# Patient Record
Sex: FEMALE | Race: WHITE | NOT HISPANIC OR LATINO | URBAN - METROPOLITAN AREA
[De-identification: names, ages, dates, MRNs, and addresses within clinical notes are randomized per-mention and may not be internally consistent; named-entity substitution may affect disease eponyms.]

---

## 2017-10-24 ENCOUNTER — IMPORTED ENCOUNTER (OUTPATIENT)
Dept: URBAN - METROPOLITAN AREA CLINIC 1 | Facility: CLINIC | Age: 67
End: 2017-10-24

## 2017-10-24 PROBLEM — H04.123: Noted: 2017-10-24

## 2017-10-24 PROBLEM — H25.813: Noted: 2017-10-24

## 2017-10-24 PROBLEM — H16.143: Noted: 2017-10-24

## 2017-10-24 PROBLEM — H43.813: Noted: 2017-10-24

## 2017-10-24 PROCEDURE — 92014 COMPRE OPH EXAM EST PT 1/>: CPT

## 2017-10-24 NOTE — PATIENT DISCUSSION
1.  Cataract OU: Observe for now without intervention. The patient was advised to contact us if any change or worsening of vision2. TAMIKO w/ PEK OU- The continuation of artificial tears were recommended. 3.  PVD OU- RD precautions. 4.  Return for an appointment for a 1dfe/glare in 6 monthswith Dr. Lasha Barker.

## 2018-04-24 ENCOUNTER — IMPORTED ENCOUNTER (OUTPATIENT)
Dept: URBAN - METROPOLITAN AREA CLINIC 1 | Facility: CLINIC | Age: 68
End: 2018-04-24

## 2018-04-24 PROBLEM — H04.123: Noted: 2018-04-24

## 2018-04-24 PROBLEM — H25.813: Noted: 2018-04-24

## 2018-04-24 PROBLEM — H43.813: Noted: 2018-04-24

## 2018-04-24 PROBLEM — H16.143: Noted: 2018-04-24

## 2018-04-24 PROCEDURE — 92015 DETERMINE REFRACTIVE STATE: CPT

## 2018-04-24 PROCEDURE — 92014 COMPRE OPH EXAM EST PT 1/>: CPT

## 2018-04-24 NOTE — PATIENT DISCUSSION
1.  Cataract OU:  Visually Significant secondary to glare discussed the risks benefits alternatives and limitations of cataract surgery. The patient stated a full understanding and a desire to proceed with the procedure. The patient will need to return for preop appointment with cataract measurements and to have any additional questions answered and start pre-operative eye drops as directed. Phaco PCL OS then OD. (Otherwise follow-up October 30 glare) 2. TAMIKO w/ PEK OU- Cont ATs TID OU Routinely. 3.  PVD OU - RD precautions.

## 2018-05-18 ENCOUNTER — IMPORTED ENCOUNTER (OUTPATIENT)
Dept: URBAN - METROPOLITAN AREA CLINIC 1 | Facility: CLINIC | Age: 68
End: 2018-05-18

## 2018-05-18 PROBLEM — H25.812: Noted: 2018-05-18

## 2018-05-18 PROCEDURE — 92136 OPHTHALMIC BIOMETRY: CPT

## 2018-05-18 NOTE — PATIENT DISCUSSION
Cataract OS: Visually Significant secondary to glare discussed the risks benefits alternatives and limitations of cataract surgery. The patient stated a full understanding and a desire to proceed with the procedure. The patient will need to start pre-operative eye drops as directed. Proceed w/ phaco PCL OSDiscussed with patient and patient understands halos around lights and glare are expected post-op particularly at night with the MF lens choice. Pt understood. Return for an appointment for sx OS with Dr. Lasha Barker.

## 2018-05-30 ENCOUNTER — IMPORTED ENCOUNTER (OUTPATIENT)
Dept: URBAN - METROPOLITAN AREA CLINIC 1 | Facility: CLINIC | Age: 68
End: 2018-05-30

## 2018-05-31 ENCOUNTER — IMPORTED ENCOUNTER (OUTPATIENT)
Dept: URBAN - METROPOLITAN AREA CLINIC 1 | Facility: CLINIC | Age: 68
End: 2018-05-31

## 2018-05-31 PROBLEM — Z96.1: Noted: 2018-05-31

## 2018-05-31 PROCEDURE — 99024 POSTOP FOLLOW-UP VISIT: CPT

## 2018-05-31 NOTE — PATIENT DISCUSSION
POD#1 CE/IOL TMF ZKBOO OS doing well. Stable IOP. Continue all 3 gtts as prescribed and until gone. Post op Warnings Reiterated RTC as scheduled

## 2018-06-07 ENCOUNTER — IMPORTED ENCOUNTER (OUTPATIENT)
Dept: URBAN - METROPOLITAN AREA CLINIC 1 | Facility: CLINIC | Age: 68
End: 2018-06-07

## 2018-06-07 PROBLEM — H25.811: Noted: 2018-06-07

## 2018-06-07 PROCEDURE — 92136 OPHTHALMIC BIOMETRY: CPT

## 2018-06-20 ENCOUNTER — IMPORTED ENCOUNTER (OUTPATIENT)
Dept: URBAN - METROPOLITAN AREA CLINIC 1 | Facility: CLINIC | Age: 68
End: 2018-06-20

## 2018-06-21 ENCOUNTER — IMPORTED ENCOUNTER (OUTPATIENT)
Dept: URBAN - METROPOLITAN AREA CLINIC 1 | Facility: CLINIC | Age: 68
End: 2018-06-21

## 2018-06-21 PROBLEM — Z96.1: Noted: 2018-06-21

## 2018-06-21 PROCEDURE — 99024 POSTOP FOLLOW-UP VISIT: CPT

## 2018-06-21 NOTE — PATIENT DISCUSSION
1. POD#1 CE/IOL OD doing well. (Symfony)Continue all 3 gtts as prescribed and until gone. Post op Warnings Reiterated RTC as scheduled 2. Return for an appointment for Return as scheduled with Dr. Salena Azul.

## 2018-07-13 ENCOUNTER — IMPORTED ENCOUNTER (OUTPATIENT)
Dept: URBAN - METROPOLITAN AREA CLINIC 1 | Facility: CLINIC | Age: 68
End: 2018-07-13

## 2018-07-13 PROBLEM — Z96.1: Noted: 2018-07-13

## 2018-07-13 PROCEDURE — 99024 POSTOP FOLLOW-UP VISIT: CPT

## 2018-07-13 NOTE — PATIENT DISCUSSION
1. POW#3 Phaco/ PCL OU (Symfony MF- ZXROO OD TMF- ZKBOO OS; H/o LenSx OU) with PCO OU noted on today's examination. Finish PO meds per schedule Increase ATs to QID OU Routinely. F/u for PCO check/ likely schedule YAG next visit. Return for an appointment in 2.5 mo 30 glare (Consider YAG) with Dr. Lizbeth Sullivan.

## 2019-01-17 ENCOUNTER — IMPORTED ENCOUNTER (OUTPATIENT)
Dept: URBAN - METROPOLITAN AREA CLINIC 1 | Facility: CLINIC | Age: 69
End: 2019-01-17

## 2019-01-17 PROBLEM — Z96.1: Noted: 2019-01-17

## 2019-01-17 PROBLEM — H43.813: Noted: 2019-01-17

## 2019-01-17 PROBLEM — H26.493: Noted: 2019-01-17

## 2019-01-17 PROBLEM — H04.123: Noted: 2019-01-17

## 2019-01-17 PROBLEM — H16.143: Noted: 2019-01-17

## 2019-01-17 PROCEDURE — 92014 COMPRE OPH EXAM EST PT 1/>: CPT

## 2019-01-17 NOTE — PATIENT DISCUSSION
1.  TAMIKO w/ PEK OU- Increase ATs to TID OU Routinely. 2.  PCO OU- Observe. 3.  Pseudophakia OU - (Symfony MF ZXROO OD TMF ZKBOO OS) H/o LenSx OU  4. PVD OU - RD precautions. Letter to PCP Return for an appointment in 6 mo 10 dfe glare (Consider YAG) with Dr. Reji Teran.

## 2019-03-18 ENCOUNTER — IMPORTED ENCOUNTER (OUTPATIENT)
Dept: URBAN - METROPOLITAN AREA CLINIC 1 | Facility: CLINIC | Age: 69
End: 2019-03-18

## 2019-03-18 PROBLEM — H43.813: Noted: 2019-03-18

## 2019-03-18 PROBLEM — H26.493: Noted: 2019-03-18

## 2019-03-18 PROBLEM — H16.143: Noted: 2019-03-18

## 2019-03-18 PROBLEM — H04.123: Noted: 2019-03-18

## 2019-03-18 PROCEDURE — 92015 DETERMINE REFRACTIVE STATE: CPT

## 2019-03-18 PROCEDURE — 92014 COMPRE OPH EXAM EST PT 1/>: CPT

## 2019-03-18 NOTE — PATIENT DISCUSSION
1.  PCO OU -- Visually Significant secondary to glare; Schedule YAG Cap OD then OS. Pros cons risks and benefits. 2.  TAMIKO w/ PEK OU -- Recommend increase the frequent use of OTC AT's TID-QID OU Routinely. 3.  PVD OU -- Old Stable. RD precautions given. 4.  Pseudophakia OU (OD- Symfony MF ZXROO / OS- TMF ZKBOO) H/o LenSx OU. Return for next available YAG Cap Laser (OD then OS) with Dr. Lorna Paniagua.

## 2019-05-03 ENCOUNTER — IMPORTED ENCOUNTER (OUTPATIENT)
Dept: URBAN - METROPOLITAN AREA CLINIC 1 | Facility: CLINIC | Age: 69
End: 2019-05-03

## 2019-05-03 PROBLEM — H26.491: Noted: 2019-05-03

## 2019-05-03 PROCEDURE — 66821 AFTER CATARACT LASER SURGERY: CPT

## 2019-05-03 NOTE — PATIENT DISCUSSION
YAG CAP OD: (Consent signed and scanned into attachments) 1 gtt Prolensa applied. The purpose and nature of the procedure possible alternative methods of treatment the risks involved and the possibility of complications were discussed with patient. The Patient wishes to proceed and the consent was signed. The laser was then performed under topical anesthesia with no complications. Post op instructions were given to patient as well as a follow-up appointment. Patient was advised to call our office if any questions or concerns. Return for an appointment for Return as scheduled YAG Cap OS with Dr. Kassi Strauss.

## 2019-05-17 ENCOUNTER — IMPORTED ENCOUNTER (OUTPATIENT)
Dept: URBAN - METROPOLITAN AREA CLINIC 1 | Facility: CLINIC | Age: 69
End: 2019-05-17

## 2019-05-17 PROBLEM — H26.492: Noted: 2019-05-17

## 2019-05-17 PROCEDURE — 66821 AFTER CATARACT LASER SURGERY: CPT

## 2019-05-17 NOTE — PATIENT DISCUSSION
YAG CAP OS: (Consent signed and scanned into attachments) 1 gtt Prolensa applied. The purpose and nature of the procedure possible alternative methods of treatment the risks involved and the possibility of complications were discussed with patient. The Patient wishes to proceed and the consent was signed. The laser was then performed under topical anesthesia with no complications. Post op instructions were given to patient as well as a follow-up appointment. Patient was advised to call our office if any questions or concerns. Return for an appointment in 1-3 month po/YAG with Dr. Ciro Sharma.

## 2019-08-15 ENCOUNTER — IMPORTED ENCOUNTER (OUTPATIENT)
Dept: URBAN - METROPOLITAN AREA CLINIC 1 | Facility: CLINIC | Age: 69
End: 2019-08-15

## 2019-08-15 PROCEDURE — 99024 POSTOP FOLLOW-UP VISIT: CPT

## 2019-08-15 NOTE — PATIENT DISCUSSION
PO YAG Cap OU: good result F/u in 6 months. Return for an appointment in 6 mo 30 with Dr. Kylee Parrish.

## 2020-02-20 ENCOUNTER — IMPORTED ENCOUNTER (OUTPATIENT)
Dept: URBAN - METROPOLITAN AREA CLINIC 1 | Facility: CLINIC | Age: 70
End: 2020-02-20

## 2020-02-20 PROBLEM — H16.143: Noted: 2020-02-20

## 2020-02-20 PROBLEM — Z96.1: Noted: 2020-02-20

## 2020-02-20 PROBLEM — H43.813: Noted: 2020-02-20

## 2020-02-20 PROBLEM — H04.123: Noted: 2020-02-20

## 2020-02-20 PROCEDURE — 92014 COMPRE OPH EXAM EST PT 1/>: CPT

## 2020-02-20 NOTE — PATIENT DISCUSSION
1.  TAMIKO w/ PEK OU -- Increase ATs to QID OU Routinely. 2.  PVD OU -- Stable. RD precautions. 3.  Pseudophakia OU (OD- Symfony MF ZXROO / OS- TMF ZKBOO) H/o LenSx OU. H/o YAG Cap OU Letter to PCP MRx deferredReturn for an appointment in 1 yr 27 with Dr. Claudeen Cobble.

## 2021-02-18 ENCOUNTER — IMPORTED ENCOUNTER (OUTPATIENT)
Dept: URBAN - METROPOLITAN AREA CLINIC 1 | Facility: CLINIC | Age: 71
End: 2021-02-18

## 2021-02-18 PROBLEM — H16.143: Noted: 2021-02-18

## 2021-02-18 PROBLEM — H43.813: Noted: 2021-02-18

## 2021-02-18 PROBLEM — Z96.1: Noted: 2021-02-18

## 2021-02-18 PROBLEM — H04.123: Noted: 2021-02-18

## 2021-02-18 PROCEDURE — 99214 OFFICE O/P EST MOD 30 MIN: CPT

## 2021-02-18 NOTE — PATIENT DISCUSSION
1.  TAMIKO w/ PEK OU- Begin Restasis BID OU (erx). Recommend ATs QID OU routinely. Advised to instill ATs prior to and immediately after Restasis 2. Pseudophakia OU -  (OD- Symfony MF ZXROO/OS- TMF ZKBOO w/ LenSx OU) H/o YAG Cap OU 3. PVD OU - RD precautions. Patient deferred Manifest Rx today. Return for an appointment in 3 months 10 (check ks) with Dr. Ranulfo Calle.

## 2021-05-18 ENCOUNTER — IMPORTED ENCOUNTER (OUTPATIENT)
Dept: URBAN - METROPOLITAN AREA CLINIC 1 | Facility: CLINIC | Age: 71
End: 2021-05-18

## 2021-05-18 ENCOUNTER — PREPPED CHART (OUTPATIENT)
Dept: URBAN - METROPOLITAN AREA CLINIC 1 | Facility: CLINIC | Age: 71
End: 2021-05-18

## 2021-05-18 PROBLEM — H16.143: Noted: 2021-05-18

## 2021-05-18 PROBLEM — H04.123: Noted: 2021-05-18

## 2021-05-18 PROCEDURE — 99213 OFFICE O/P EST LOW 20 MIN: CPT

## 2021-05-18 NOTE — PATIENT DISCUSSION
1.  TAMIKO w/ PEK OU -- PEK improved OU on Restasis. Cont Restasis BID OU. Continue ATs QID OU routinely. 2.  Pseudophakia OU --  (OD- Symfony MF ZXROO/OS- TMF ZKBOO w/ LenSx OU) H/o YAG Cap OU 3. PVD OU -- RD precautions. .Return for an appointment in January for a 27 with Dr. Dolly Grayson.

## 2021-09-22 NOTE — PATIENT DISCUSSION
Discussed removal of benign keratosis, liquid nitrogen vs shaving off growth. Recommend to shave off growth, pt elects to proceed, consent signed, photos taken 9/22/21.

## 2021-09-22 NOTE — PROCEDURE NOTE: CLINICAL
PROCEDURE NOTE: Tangential Biopsy of Skin, Single Lesion #1 Risks, benefits and alternatives were discussed. Patient desires to proceed with biopsy today. The involved area was prepped using an alcohol wipe and anesthetized using 1% lidocaine with epi. Lesion was excised using a dermablade and discarded. The wound was cauterized to achieve hemostasis. Erythromycin ointment was placed on the wound. The patient tolerated the procedure well and left in good condition. Delgado Murphy

## 2022-02-22 ENCOUNTER — ESTABLISHED PATIENT (OUTPATIENT)
Dept: URBAN - METROPOLITAN AREA CLINIC 1 | Facility: CLINIC | Age: 72
End: 2022-02-22

## 2022-02-22 DIAGNOSIS — Z96.1: ICD-10-CM

## 2022-02-22 DIAGNOSIS — G43.B0: ICD-10-CM

## 2022-02-22 DIAGNOSIS — H43.813: ICD-10-CM

## 2022-02-22 DIAGNOSIS — H04.123: ICD-10-CM

## 2022-02-22 PROCEDURE — 92012 INTRM OPH EXAM EST PATIENT: CPT

## 2022-02-22 ASSESSMENT — VISUAL ACUITY
OS_SC: 20/20 -1
OS_SC: 20/20-1
OD_SC: 20/20-1
OD_SC: 20/20 -2
OU_SC: J1+

## 2022-02-22 ASSESSMENT — TONOMETRY
OD_IOP_MMHG: 12
OD_IOP_MMHG: 12
OS_IOP_MMHG: 12
OS_IOP_MMHG: 12

## 2022-03-25 ENCOUNTER — ESTABLISHED PATIENT (OUTPATIENT)
Dept: URBAN - METROPOLITAN AREA CLINIC 1 | Facility: CLINIC | Age: 72
End: 2022-03-25

## 2022-03-25 DIAGNOSIS — H04.123: ICD-10-CM

## 2022-03-25 PROCEDURE — 99213 OFFICE O/P EST LOW 20 MIN: CPT

## 2022-03-25 ASSESSMENT — TONOMETRY
OD_IOP_MMHG: 14
OS_IOP_MMHG: 14

## 2022-03-25 ASSESSMENT — VISUAL ACUITY
OD_SC: 20/20
OS_SC: 20/20

## 2022-04-08 ASSESSMENT — VISUAL ACUITY
OD_SC: 20/20-1
OD_SC: J1
OD_CC: 20/20-1
OS_SC: 20/25
OS_SC: 20/20
OS_CC: 20/20
OD_SC: 20/20
OS_CC: 20/20
OD_SC: J1+
OD_GLARE: 20/400
OS_SC: J1
OU_SC: 20/25
OS_CC: 20/20
OS_GLARE: 20/400
OS_CC: 20/20-1
OD_CC: 20/20
OD_CC: 20/20-1
OS_CC: 20/30
OS_SC: J1
OD_CC: 20/25-2
OS_SC: J1
OS_CC: J1
OS_CC: 20/20-2
OD_SC: J1
OS_CC: 20/20
OD_SC: J1
OS_SC: J1+
OD_SC: 20/20
OS_SC: J1
OD_CC: 20/30
OD_CC: 20/20-2
OD_CC: 20/20
OD_SC: J1-2
OS_SC: J1
OS_CC: 20/20
OS_CC: 20/20
OD_GLARE: 20/60
OS_GLARE: 20/60
OD_CC: 20/25
OD_GLARE: 20/400
OS_CC: 20/20-1
OS_SC: J1
OD_CC: J1

## 2022-04-08 ASSESSMENT — TONOMETRY
OD_IOP_MMHG: 13
OS_IOP_MMHG: 13
OS_IOP_MMHG: 13
OD_IOP_MMHG: 13
OS_IOP_MMHG: 14
OD_IOP_MMHG: 13
OD_IOP_MMHG: 13
OS_IOP_MMHG: 13
OS_IOP_MMHG: 14
OD_IOP_MMHG: 12
OS_IOP_MMHG: 14
OS_IOP_MMHG: 13
OD_IOP_MMHG: 14
OS_IOP_MMHG: 12
OS_IOP_MMHG: 16
OS_IOP_MMHG: 14
OD_IOP_MMHG: 16
OD_IOP_MMHG: 16
OS_IOP_MMHG: 13
OS_IOP_MMHG: 14

## 2022-08-23 ENCOUNTER — FOLLOW UP (OUTPATIENT)
Dept: URBAN - METROPOLITAN AREA CLINIC 1 | Facility: CLINIC | Age: 72
End: 2022-08-23

## 2022-08-23 DIAGNOSIS — H04.123: ICD-10-CM

## 2022-08-23 PROCEDURE — 99213 OFFICE O/P EST LOW 20 MIN: CPT

## 2022-08-23 ASSESSMENT — TONOMETRY
OS_IOP_MMHG: 13
OD_IOP_MMHG: 13

## 2022-08-23 ASSESSMENT — VISUAL ACUITY
OS_SC: 20/20
OD_SC: 20/20

## 2022-08-23 NOTE — PATIENT DISCUSSION
Cont Restasis BID OU (Erx'd). Unable to tolerate Xiidra. Consider Cequa if cost of Restasis is too much.

## 2023-08-29 ENCOUNTER — FOLLOW UP (OUTPATIENT)
Dept: URBAN - METROPOLITAN AREA CLINIC 1 | Facility: CLINIC | Age: 73
End: 2023-08-29

## 2023-08-29 DIAGNOSIS — H04.123: ICD-10-CM

## 2023-08-29 PROCEDURE — 99213 OFFICE O/P EST LOW 20 MIN: CPT

## 2023-08-29 ASSESSMENT — VISUAL ACUITY
OS_SC: 20/20-2
OD_SC: 20/25

## 2023-08-29 ASSESSMENT — TONOMETRY
OS_IOP_MMHG: 14
OD_IOP_MMHG: 14

## 2023-12-11 ENCOUNTER — HOSPITAL ENCOUNTER (OUTPATIENT)
Facility: HOSPITAL | Age: 73
Setting detail: RECURRING SERIES
Discharge: HOME | End: 2023-12-14
Payer: MEDICARE

## 2023-12-11 PROCEDURE — 97162 PT EVAL MOD COMPLEX 30 MIN: CPT | Performed by: GENERAL ACUTE CARE HOSPITAL

## 2023-12-11 PROCEDURE — 97140 MANUAL THERAPY 1/> REGIONS: CPT | Performed by: GENERAL ACUTE CARE HOSPITAL

## 2023-12-11 NOTE — PROGRESS NOTES
[address] [place] [address] PHYSICAL THERAPY  [address] [facility] [address] [facility], [address] Phone: [phone] Fax [phone]  Plan of Care / Statement of Necessity for Physical Therapy Services     Patient Name: David Weir : 1950   Medical   Diagnosis: Left shoulder pain [M25.512] Treatment Diagnosis: M25.512  LEFT SHOULDER PAIN     Onset Date: 2023 Payor   Payor: Bree Solis / Plan: MEDICARE PART A AND B / Product Type: *No Product type* /    Referral Source: Jenna Lovell MD [facility]): 2023   Prior Hospitalization: See medical history Provider #: [id number]   Prior Level of Function: IND, retired    Comorbidities: Arthritis, Back pain, Prior Surgery     Assessment / key information:    SUBJECTIVE:  The patient presents with c/o left shoulder pain. Patient underwent RTC repair on 2023, underwent 4 weeks of skilled PT following surgery through the week of . On , patient said she was prepping meals and felt excruciating pain and had bruising at the middle of her upper arm. Patient states MD did x-rays, and ultrasound and was informed that the repair was intact and bruising was due to scar tissue. Patient states she is doing pendulums at home. Patient denies numbness/tingling/headaches. Functional Limitations:  Pain with reaching overhead, pain with reaching out to the side, occasional waking from pain,      Max pain: 5/10  Avg pain: 0/10  Min pain: 0/10     FOTO:  61/100     Past Medical Hx:  RTC repair 2023     Co-morbidities: : Arthritis, Back pain, Prior Surgery    Objective Information/Functional Measures/Assessment:        Fall Risk Assessment: Does the patient have a fear of falling? no If yes, what fall risk assessment was performed?  N/A       C/S SCREEN: WNL      strength (RHD):  Right: 55#   Left: 35#      Right shoulder strength: 5/5 globally      Left shoulder strength: NT      Right shoulder AROM:
IND and compliant with HEP and self management of symptoms. Status at eval: DEVELOPED   Current:    The patient will improve passive shoulder ABD to 140 deg to improve her OH mobility. Status at eval: 120 deg   Current:    The patient will improve left shoulder flexion AROM to 140 deg to improve her OH mobility. Status at eval: 130 deg with c/o mild discomfort   Current:        Long term goals to be accomplished in 24 visits: The patient will present with left shoulder strength globally of 5/5 for improved ADL and household chore tolerance. Status at eval: NT   Current:    The patient will improve left shoulder ABD AROM to 140 deg to improve her OH mobility. .   Status at eval: NT per MD instruction on prescription   Current:    The patient will improve left functional ER AROM to C7 and IR AROM to T7 for improved ADL tolerance. Status at eval: functional ER C7, functional IR T12  Current:    The patient will improve FOTO score to 70/100 as a functional indicator of improved mobility. Status at eval: 61/100  Current:       PLAN  yes Continue plan of care  []  Upgrade activities as tolerated  []  Discharge due to :  [x]  Other: Pt to begin treatment  2 times a week for 24 visits. Pt to be re-evaluated after the next 10 visits or 30 days which ever comes first.       Justification for Eval Code Complexity:  Patient History : left RTC repair 09/27/2023  Examination see exam   Clinical Presentation: Evolving with changing characteristics   Clinical Decision Making : FOTO : 64 /100    Joe Shrestha, PT    12/11/2023    1:22 PM    No future appointments.

## 2023-12-22 ENCOUNTER — HOSPITAL ENCOUNTER (OUTPATIENT)
Facility: HOSPITAL | Age: 73
Setting detail: RECURRING SERIES
Discharge: HOME | End: 2023-12-25
Payer: MEDICARE

## 2023-12-22 PROCEDURE — 97530 THERAPEUTIC ACTIVITIES: CPT | Performed by: GENERAL ACUTE CARE HOSPITAL

## 2023-12-22 PROCEDURE — 97110 THERAPEUTIC EXERCISES: CPT | Performed by: GENERAL ACUTE CARE HOSPITAL

## 2023-12-22 PROCEDURE — 97140 MANUAL THERAPY 1/> REGIONS: CPT | Performed by: GENERAL ACUTE CARE HOSPITAL

## 2023-12-22 NOTE — PROGRESS NOTES
PHYSICAL / OCCUPATIONAL THERAPY - DAILY TREATMENT NOTE (updated )    Patient Name: Annia Li    Date: 2023    : 1950  Insurance: Payor: MEDICARE / Plan: MEDICARE PART A AND B / Product Type: *No Product type* /      Patient  verified yes     Visit #   Current / Total 3 24   Time   In / Out 1140 1219   Total Treatment Time 39   Pain   In / Out 0 0   Subjective Functional Status/Changes: Patient states she was able to do a lot of baking this weekend and felt good. NEXT PROGRESS NOTE DUE: 01/10/2024    TREATMENT AREA =  Left shoulder pain [M25.512]    OBJECTIVE    Modalities Rationale:     decrease inflammation and decrease pain to improve patient's ability to progress to PLOF and address remaining functional goals. min [] Estim Unattended, type/location:                                      []  w/ice    []  w/heat    min [] Estim Attended, type/location:                                     []  w/US     []  w/ice    []  w/heat    []  TENS insruct      min []  Mechanical Traction: type/lbs                   []  pro   []  sup   []  int   []  cont    []  before manual    []  after manual    min []  Ultrasound, settings/location:      min []  Iontophoresis w/ dexamethasone, location:                                               []  take home patch       []  in clinic    min  unbilled []  Ice     []  Heat    location/position:     min []  Paraffin,  details:     min []  Vasopneumatic Device, press/temp:     min []  Silverio Francois / Amaya Lasso:     If using vaso (only need to measure limb vaso being performed on)      pre-treatment girth :       post-treatment girth :       measured at (landmark location) :      min []  Other:    Skin assessment post-treatment (if applicable):    []  intact    []  redness- no adverse reaction                 []redness - adverse reaction:      Vasopnuematic compression justification:  Per bilateral girth measures taken and listed above the edema is considered

## 2023-12-27 ENCOUNTER — HOSPITAL ENCOUNTER (OUTPATIENT)
Facility: HOSPITAL | Age: 73
Setting detail: RECURRING SERIES
Discharge: HOME | End: 2023-12-30
Payer: MEDICARE

## 2023-12-27 PROCEDURE — 97110 THERAPEUTIC EXERCISES: CPT | Performed by: GENERAL ACUTE CARE HOSPITAL

## 2023-12-27 PROCEDURE — 97140 MANUAL THERAPY 1/> REGIONS: CPT | Performed by: GENERAL ACUTE CARE HOSPITAL

## 2023-12-27 PROCEDURE — 97530 THERAPEUTIC ACTIVITIES: CPT | Performed by: GENERAL ACUTE CARE HOSPITAL

## 2023-12-27 NOTE — PROGRESS NOTES
PHYSICAL / OCCUPATIONAL THERAPY - DAILY TREATMENT NOTE (updated )    Patient Name: Jacobo Morocho    Date: 2023    : 1950  Insurance: Payor: MEDICARE / Plan: MEDICARE PART A AND B / Product Type: *No Product type* /      Patient  verified yes     Visit #   Current / Total 4 24   Time   In / Out 821  904    Total Treatment Time 43    Pain   In / Out 0 0   Subjective Functional Status/Changes: Patient states her shoulder is feeling really good. NEXT PROGRESS NOTE DUE: 01/10/2024    TREATMENT AREA =  Left shoulder pain [M25.512]    OBJECTIVE    Modalities Rationale:     decrease inflammation and decrease pain to improve patient's ability to progress to PLOF and address remaining functional goals. min [] Estim Unattended, type/location:                                      []  w/ice    []  w/heat    min [] Estim Attended, type/location:                                     []  w/US     []  w/ice    []  w/heat    []  TENS insruct      min []  Mechanical Traction: type/lbs                   []  pro   []  sup   []  int   []  cont    []  before manual    []  after manual    min []  Ultrasound, settings/location:      min []  Iontophoresis w/ dexamethasone, location:                                               []  take home patch       []  in clinic    min  unbilled []  Ice     []  Heat    location/position:     min []  Paraffin,  details:     min []  Vasopneumatic Device, press/temp:     min []  Lannis [place] / Godwin Luo:     If using vaso (only need to measure limb vaso being performed on)      pre-treatment girth :       post-treatment girth :       measured at (landmark location) :      min []  Other:    Skin assessment post-treatment (if applicable):    []  intact    []  redness- no adverse reaction                 []redness - adverse reaction:      Vasopnuematic compression justification:  Per bilateral girth measures taken and listed above the edema is considered significant and having an

## 2023-12-29 ENCOUNTER — HOSPITAL ENCOUNTER (OUTPATIENT)
Facility: HOSPITAL | Age: 73
Setting detail: RECURRING SERIES
Discharge: HOME | End: 2024-01-01
Payer: MEDICARE

## 2023-12-29 PROCEDURE — G0283 ELEC STIM OTHER THAN WOUND: HCPCS

## 2023-12-29 PROCEDURE — 97140 MANUAL THERAPY 1/> REGIONS: CPT

## 2023-12-29 PROCEDURE — 97530 THERAPEUTIC ACTIVITIES: CPT

## 2023-12-29 NOTE — PROGRESS NOTES
Other:    Sheyla Song, JUDITH    12/29/2023    8:00 AM    Future Appointments   Date Time Provider Department Center   12/29/2023 10:20 AM Sheyla Song, PT [facility]   1/3/2024 11:00 AM Sheyla Song, PT [facility]   1/5/2024  9:40 AM Anayeli Summers, PT [facility]   1/8/2024  9:40 AM Sheyla Song, PT [facility]   1/10/2024  9:40 AM Sheyla Song, PT [facility]   1/15/2024  9:40 AM Sheyla Song, PT [facility]   1/17/2024  9:40 AM Sheyla Song, PT [facility]

## 2024-01-03 ENCOUNTER — HOSPITAL ENCOUNTER (OUTPATIENT)
Facility: HOSPITAL | Age: 74
Setting detail: RECURRING SERIES
Discharge: HOME | End: 2024-01-06
Payer: MEDICARE

## 2024-01-03 PROCEDURE — 97110 THERAPEUTIC EXERCISES: CPT | Performed by: GENERAL ACUTE CARE HOSPITAL

## 2024-01-03 PROCEDURE — 97140 MANUAL THERAPY 1/> REGIONS: CPT | Performed by: GENERAL ACUTE CARE HOSPITAL

## 2024-01-03 PROCEDURE — 97530 THERAPEUTIC ACTIVITIES: CPT | Performed by: GENERAL ACUTE CARE HOSPITAL

## 2024-01-03 NOTE — PROGRESS NOTES
PHYSICAL / OCCUPATIONAL THERAPY - DAILY TREATMENT NOTE (updated )    Patient Name: Rachell Monet    Date: 1/3/2024    : 1950  Insurance: Payor: MEDICARE / Plan: MEDICARE PART A AND B / Product Type: *No Product type* /      Patient  verified yes     Visit #   Current / Total 6 24   Time   In / Out 1100 1142    Total Treatment Time 42    Pain   In / Out 2/10 0/10    Subjective Functional Status/Changes: Patient states her shoulder is feeling achy but overall feeling good. She reports good compliance with HEP.      NEXT PROGRESS NOTE DUE: 01/10/2024    TREATMENT AREA =  Left shoulder pain [M25.512]    OBJECTIVE    Modalities Rationale:     decrease pain and increase tissue extensibility to improve patient's ability to progress to PLOF and address remaining functional goals.    PD min [x] Estim Unattended, type/location: IFC to Left shoulder                                      []  w/ice    []  w/heat    min [] Estim Attended, type/location:                                     []  w/US     []  w/ice    []  w/heat    []  TENS insruct      min []  Mechanical Traction: type/lbs                   []  pro   []  sup   []  int   []  cont    []  before manual    []  after manual    min []  Ultrasound, settings/location:      min []  Iontophoresis w/ dexamethasone, location:                                               []  take home patch       []  in clinic    min  unbilled []  Ice     []  Heat    location/position:     min []  Paraffin,  details:     min []  Vasopneumatic Device, press/temp:     min []  [employer]irl[employer] / Fluido:    If using vaso (only need to measure limb vaso being performed on)      pre-treatment girth :       post-treatment girth :       measured at (landmark location) :      min []  Other:    Skin assessment post-treatment (if applicable):    [x]  intact    []  redness- no adverse reaction                 []redness - adverse reaction:      Vasopnuematic compression justification:  Per

## 2024-01-05 ENCOUNTER — HOSPITAL ENCOUNTER (OUTPATIENT)
Facility: HOSPITAL | Age: 74
Setting detail: RECURRING SERIES
Discharge: HOME | End: 2024-01-08
Payer: MEDICARE

## 2024-01-05 PROCEDURE — 97110 THERAPEUTIC EXERCISES: CPT | Performed by: PHYSICAL THERAPIST

## 2024-01-05 PROCEDURE — 97530 THERAPEUTIC ACTIVITIES: CPT | Performed by: PHYSICAL THERAPIST

## 2024-01-05 PROCEDURE — 97140 MANUAL THERAPY 1/> REGIONS: CPT | Performed by: PHYSICAL THERAPIST

## 2024-01-05 NOTE — PROGRESS NOTES
min of TIMED therapeutic procedures (example: do not include dry needle or estim unattended, both untimed codes, in totals to left)  8-22 min = 1 unit; 23-37 min = 2 units; 38-52 min = 3 units; 53-67 min = 4 units; 68-82 min = 5 units   Total Total     [x]  Patient Education billed concurrently with other procedures   [x] Review HEP    [] Progressed/Changed HEP, detail:    [] Other detail:       Objective Information/Functional Measures/Assessment:  Progressed to bent over triceps press with 2# (towel under arm)  Improved mobility with ER AAROM in supine with wand   Pinching pain reported at ~125deg in supine with wand AAROM  No pain upon PROM   Progress as tolerated nv       Patient will continue to benefit from skilled PT / OT services to modify and progress therapeutic interventions, analyze and address functional mobility deficits, analyze and address ROM deficits, analyze and address strength deficits, analyze and address soft tissue restrictions, analyze and cue for proper movement patterns, analyze and modify for postural abnormalities, analyze and address imbalance/dizziness, and instruct in home and community integration to address functional deficits and attain remaining goals.    Progress toward goals / Updated goals:  []  See Progress Note/Recertification    Short term goals to be accomplished in 10 visits:   The pt will be IND and compliant with HEP and self management of symptoms.    Status at eval: DEVELOPED   Current:  reports compliance 12/18/2023, reports good compliance 01/03/2024  The patient will improve passive shoulder ABD to 140 deg to improve her OH mobility.   Status at eval: 120 deg   Current:   AAROM 105 deg 12/27/2023, prom FLEXION ~130deg   The patient will improve left shoulder flexion AROM to 140 deg to improve her OH mobility.   Status at eval: 130 deg with c/o mild discomfort   Current:  remains 130 deg 12/22/2023, AAROM 145 deg 12/27/2023    PLAN  [x]  Continue plan of care  []

## 2024-01-08 ENCOUNTER — HOSPITAL ENCOUNTER (OUTPATIENT)
Facility: HOSPITAL | Age: 74
Setting detail: RECURRING SERIES
Discharge: HOME | End: 2024-01-11
Payer: MEDICARE

## 2024-01-08 PROCEDURE — 97140 MANUAL THERAPY 1/> REGIONS: CPT

## 2024-01-08 PROCEDURE — 97530 THERAPEUTIC ACTIVITIES: CPT

## 2024-01-08 PROCEDURE — G0283 ELEC STIM OTHER THAN WOUND: HCPCS

## 2024-01-08 PROCEDURE — 97110 THERAPEUTIC EXERCISES: CPT

## 2024-01-08 NOTE — PROGRESS NOTES
significant and having an impact on the patient's strength, self care, and ADL's     Therapeutic Procedures:  Tx Min Billable or 1:1 Min (if diff from Tx Min) Procedure, Rationale, Specifics   10 10 [id number] Therapeutic Exercise (timed):  increase ROM, strength, coordination, balance, and proprioception to improve patient's ability to progress to PLOF and address remaining functional goals. (see flow sheet as applicable)     Details if applicable:    Shoulder iso at wall with ball   bicep curls  Tricep extensions       TC:   Seated wrist flexion/ext    24 24 [id number] Therapeutic Activity (timed):  use of dynamic activities replicating functional movements to increase ROM, strength, coordination, balance, and proprioception in order to improve patient's ability to progress to PLOF and address remaining functional goals.  (see flow sheet as applicable)     Details if applicable:    Pulley's flexion, scaption   full cans flexion    TB rows, ext   Supine wand ER, flexion     [id number] Neuromuscular Re-Education (timed):  improve balance, coordination, kinesthetic sense, posture, core stability and proprioception to improve patient's ability to develop conscious control of individual muscles and awareness of position of extremities in order to progress to PLOF and address remaining functional goals. (see flow sheet as applicable)     Details if applicable:     15 15 [id number] Manual Therapy (timed):  decrease pain, increase ROM, increase tissue extensibility, and decrease trigger points to improve patient's ability to progress to PLOF and address remaining functional goals.  The manual therapy interventions were performed at a separate and distinct time from the therapeutic activities interventions . (see flow sheet as applicable)     Details if applicable:    Elbow and shoulder PROM all planes, lateral subscapularis release, sidelying STM along medial Left scapular border          Details if applicable:     49 49 [facility] Totals

## 2024-01-10 ENCOUNTER — HOSPITAL ENCOUNTER (OUTPATIENT)
Facility: HOSPITAL | Age: 74
Setting detail: RECURRING SERIES
Discharge: HOME | End: 2024-01-13
Payer: MEDICARE

## 2024-01-10 PROCEDURE — 97530 THERAPEUTIC ACTIVITIES: CPT

## 2024-01-10 PROCEDURE — 97140 MANUAL THERAPY 1/> REGIONS: CPT

## 2024-01-10 PROCEDURE — G0283 ELEC STIM OTHER THAN WOUND: HCPCS

## 2024-01-10 NOTE — PROGRESS NOTES
PHYSICAL / OCCUPATIONAL THERAPY - DAILY TREATMENT NOTE (updated )    Patient Name: Rachell Monet    Date: 1/10/2024    : 1950  Insurance: Payor: MEDICARE / Plan: MEDICARE PART A AND B / Product Type: *No Product type* /      Patient  verified yes     Visit #   Current / Total 9 24   Time   In / Out 9:41 10:37   Total Treatment Time 56   Pain   In / Out 2/10 1/10   Subjective Functional Status/Changes: It feels better than it did the other day.      NEXT PROGRESS NOTE DUE: 2024    TREATMENT AREA =  Left shoulder pain [M25.512]    OBJECTIVE    Modalities Rationale:     decrease edema, decrease inflammation, and decrease pain to improve patient's ability to progress to PLOF and address remaining functional goals.    10 min [x] Estim Unattended, type/location: IFC to Left shoulder in seated                                      [x]  w/ice    []  w/heat    min [] Estim Attended, type/location:                                     []  w/US     []  w/ice    []  w/heat    []  TENS insruct      min []  Mechanical Traction: type/lbs                   []  pro   []  sup   []  int   []  cont    []  before manual    []  after manual    min []  Ultrasound, settings/location:      min []  Iontophoresis w/ dexamethasone, location:                                               []  take home patch       []  in clinic    min  unbilled []  Ice     []  Heat    location/position:     min []  Paraffin,  details:     min []  Vasopneumatic Device, press/temp:     min []  Whirlpool / Fluido:    If using vaso (only need to measure limb vaso being performed on)      pre-treatment girth :       post-treatment girth :       measured at (landmark location) :      min []  Other:    Skin assessment post-treatment (if applicable):    [x]  intact    []  redness- no adverse reaction                 []redness - adverse reaction:      Vasopnuematic compression justification:  Per bilateral girth measures taken and listed above the 
and self management of symptoms.    Status at eval: DEVELOPED   Current:  progressing, reports ongoing compliance 01/10/2024  The patient will improve passive shoulder ABD to 140 deg to improve her OH mobility.   Status at eval: 120 deg   Current:   not met, 115 deg 01/10/2024  The patient will improve left shoulder flexion AROM to 140 deg to improve her OH mobility.   Status at eval: 130 deg with c/o mild discomfort   Current:  progressing, 136 deg 01/10/2024    LONG-TERM GOALS TO BE ACHIEVED IN 15 treatments   The patient will present with left shoulder strength globally of 5/5 for improved ADL and household chore tolerance.   Status at eval: NT   Current:  flexion 4-/5, extension 5/5, ER 4-/5, IR 4+/5 01/10/2024  The patient will improve left shoulder ABD AROM to 140 deg to improve her OH mobility..   Status at eval: NT per MD instruction on prescription   Current:  NT per MD instruction on prescription, assess after virtual follow up 01/10/2024  The patient will improve left functional ER AROM to C7 and IR AROM to T7 for improved ADL tolerance.   Status at eval: functional ER C7, functional IR T12  Current:  not met, no change 01/10/2024  The patient will improve FOTO score to 70/100 as a functional indicator of improved mobility.    Status at eval: 61/100  Current:  progressing, 70/100 pts 01/10/2024    Payor: MEDICARE / Plan: MEDICARE PART A AND B / Product Type: *No Product type* /     Frequency / Duration:   Patient to be seen   2   times per week for   15    treatments:    RECOMMENDATIONS  We would like to continue therapy for progress to goals stated above.  Continue per initial Plan of Care.    If you have any questions/comments please contact us directly.  Thank you for allowing us to assist in the care of your patient.    Sheyla Song, PT       1/10/2024       9:27 AM

## 2024-01-15 ENCOUNTER — HOSPITAL ENCOUNTER (OUTPATIENT)
Facility: HOSPITAL | Age: 74
Setting detail: RECURRING SERIES
Discharge: HOME | End: 2024-01-18
Payer: MEDICARE

## 2024-01-15 PROCEDURE — G0283 ELEC STIM OTHER THAN WOUND: HCPCS

## 2024-01-15 PROCEDURE — 97140 MANUAL THERAPY 1/> REGIONS: CPT

## 2024-01-15 PROCEDURE — 97530 THERAPEUTIC ACTIVITIES: CPT

## 2024-01-15 PROCEDURE — 97110 THERAPEUTIC EXERCISES: CPT

## 2024-01-15 NOTE — PROGRESS NOTES
PHYSICAL / OCCUPATIONAL THERAPY - DAILY TREATMENT NOTE (updated )    Patient Name: Rachell Monet    Date: 1/15/2024    : 1950  Insurance: Payor: MEDICARE / Plan: MEDICARE PART A AND B / Product Type: *No Product type* /      Patient  verified yes     Visit #   Current / Total 10 24   Time   In / Out 9:40 10:34   Total Treatment Time 54   Pain   In / Out 3/10 2/10   Subjective Functional Status/Changes: It's just sore today.      NEXT PROGRESS NOTE DUE: 2024    TREATMENT AREA =  Left shoulder pain [M25.512]    OBJECTIVE    Modalities Rationale:     decrease inflammation and decrease pain to improve patient's ability to progress to PLOF and address remaining functional goals.    10 min [x] Estim Unattended, type/location: IFC to Left shoulder in seated                                     [x]  w/ice    []  w/heat    min [] Estim Attended, type/location:                                     []  w/US     []  w/ice    []  w/heat    []  TENS insruct      min []  Mechanical Traction: type/lbs                   []  pro   []  sup   []  int   []  cont    []  before manual    []  after manual    min []  Ultrasound, settings/location:      min []  Iontophoresis w/ dexamethasone, location:                                               []  take home patch       []  in clinic    min  unbilled []  Ice     []  Heat    location/position:     min []  Paraffin,  details:     min []  Vasopneumatic Device, press/temp:     min []  Whirlpool / Fluido:    If using vaso (only need to measure limb vaso being performed on)      pre-treatment girth :       post-treatment girth :       measured at (landmark location) :      min []  Other:    Skin assessment post-treatment (if applicable):    [x]  intact    []  redness- no adverse reaction                 []redness - adverse reaction:      Vasopnuematic compression justification:  Per bilateral girth measures taken and listed above the edema is considered significant and

## 2024-01-17 ENCOUNTER — HOSPITAL ENCOUNTER (OUTPATIENT)
Facility: HOSPITAL | Age: 74
Setting detail: RECURRING SERIES
Discharge: HOME | End: 2024-01-20
Payer: MEDICARE

## 2024-01-17 PROCEDURE — 97140 MANUAL THERAPY 1/> REGIONS: CPT

## 2024-01-17 PROCEDURE — 97530 THERAPEUTIC ACTIVITIES: CPT

## 2024-01-17 PROCEDURE — 97110 THERAPEUTIC EXERCISES: CPT

## 2024-01-17 NOTE — PROGRESS NOTES
PHYSICAL / OCCUPATIONAL THERAPY - DAILY TREATMENT NOTE (updated )    Patient Name: Rachell Monet    Date: 2024    : 1950  Insurance: Payor: MEDICARE / Plan: MEDICARE PART A AND B / Product Type: *No Product type* /      Patient  verified yes     Visit #   Current / Total 11 24   Time   In / Out 9:40 10:22    Total Treatment Time 42   Pain   In / Out 1/10 0-1/10   Subjective Functional Status/Changes: It's just achy.      NEXT PROGRESS NOTE DUE: 2024    TREATMENT AREA =  Left shoulder pain [M25.512]    OBJECTIVE    Modalities Rationale:     decrease inflammation and decrease pain to improve patient's ability to progress to PLOF and address remaining functional goals.    PD min [] Estim Unattended, type/location:                                      []  w/ice    []  w/heat    min [] Estim Attended, type/location:                                     []  w/US     []  w/ice    []  w/heat    []  TENS insruct      min []  Mechanical Traction: type/lbs                   []  pro   []  sup   []  int   []  cont    []  before manual    []  after manual    min []  Ultrasound, settings/location:      min []  Iontophoresis w/ dexamethasone, location:                                               []  take home patch       []  in clinic    min  unbilled []  Ice     []  Heat    location/position:     min []  Paraffin,  details:     min []  Vasopneumatic Device, press/temp:     min []  Whirlpool / Fluido:    If using vaso (only need to measure limb vaso being performed on)      pre-treatment girth :       post-treatment girth :       measured at (landmark location) :      min []  Other:    Skin assessment post-treatment (if applicable):    []  intact    []  redness- no adverse reaction                 []redness - adverse reaction:      Vasopnuematic compression justification:  Per bilateral girth measures taken and listed above the edema is considered significant and having an impact on the patient's

## 2024-01-24 ENCOUNTER — HOSPITAL ENCOUNTER (OUTPATIENT)
Facility: HOSPITAL | Age: 74
Setting detail: RECURRING SERIES
Discharge: HOME | End: 2024-01-27
Payer: MEDICARE

## 2024-01-24 PROCEDURE — 97140 MANUAL THERAPY 1/> REGIONS: CPT

## 2024-01-24 PROCEDURE — 97110 THERAPEUTIC EXERCISES: CPT

## 2024-01-24 PROCEDURE — 97530 THERAPEUTIC ACTIVITIES: CPT

## 2024-01-24 NOTE — PROGRESS NOTES
Left scapular border, Left scapular manual distraction            Details if applicable:     39 39 MC BC Totals Reminder: bill using total billable min of TIMED therapeutic procedures (example: do not include dry needle or estim unattended, both untimed codes, in totals to left)  8-22 min = 1 unit; 23-37 min = 2 units; 38-52 min = 3 units; 53-67 min = 4 units; 68-82 min = 5 units   Total Total     [x]  Patient Education billed concurrently with other procedures   [x] Review HEP    [] Progressed/Changed HEP, detail:    [] Other detail:       Objective Information/Functional Measures/Assessment:  -Patient reports that she had increased pain last week without specific aggravating factor, but that pain did resolve after about an hour  -Patient was able to do some yard work yesterday, including raking and cleaning a flower bed for about an hour and a half; she notes no increase in pain at the time. Today Patient does have more tenderness during manual interventions, including pain at end range elevation and tenderness along medial scapular border. She does have good passive scapular mobility today  -Patient reports continued difficulty with sleeping on her Left side, but the past couple of nights she has stayed off of Left side and has not awoken due to pain   -virtual MD follow up tomorrow; plan to progress strengthening if able at next PT session     Patient will continue to benefit from skilled PT / OT services to modify and progress therapeutic interventions, analyze and address functional mobility deficits, analyze and address ROM deficits, analyze and address strength deficits, analyze and address soft tissue restrictions, analyze and cue for proper movement patterns, analyze and modify for postural abnormalities, and instruct in home and community integration to address functional deficits and attain remaining goals.    Progress toward goals / Updated goals:  []  See Progress Note/Recertification    LONG-TERM

## 2024-01-26 ENCOUNTER — HOSPITAL ENCOUNTER (OUTPATIENT)
Facility: HOSPITAL | Age: 74
Setting detail: RECURRING SERIES
Discharge: HOME | End: 2024-01-29
Payer: MEDICARE

## 2024-01-26 PROCEDURE — 97140 MANUAL THERAPY 1/> REGIONS: CPT | Performed by: GENERAL ACUTE CARE HOSPITAL

## 2024-01-26 PROCEDURE — G0283 ELEC STIM OTHER THAN WOUND: HCPCS | Performed by: GENERAL ACUTE CARE HOSPITAL

## 2024-01-26 PROCEDURE — 97530 THERAPEUTIC ACTIVITIES: CPT | Performed by: GENERAL ACUTE CARE HOSPITAL

## 2024-01-26 NOTE — PROGRESS NOTES
listed above the edema is considered significant and having an impact on the patient's strength, self care, and ADL's     Therapeutic Procedures:  Tx Min Billable or 1:1 Min (if diff from Tx Min) Procedure, Rationale, Specifics   TC  TC  85769 Therapeutic Exercise (timed):  increase ROM, strength, coordination, balance, and proprioception to improve patient's ability to progress to PLOF and address remaining functional goals. (see flow sheet as applicable)     Details if applicable:    Shoulder iso at wall with ball - held  bicep curls  Tricep extensions    28  28  68819 Therapeutic Activity (timed):  use of dynamic activities replicating functional movements to increase ROM, strength, coordination, balance, and proprioception in order to improve patient's ability to progress to PLOF and address remaining functional goals.  (see flow sheet as applicable)     Details if applicable:    UBE  TB rows, ext   Supine wand ER, flexion  full cans flexion, scaption, ABD  Standing serratus punches-TC  Wall push ups      54594 Neuromuscular Re-Education (timed):  improve balance, coordination, kinesthetic sense, posture, core stability and proprioception to improve patient's ability to develop conscious control of individual muscles and awareness of position of extremities in order to progress to PLOF and address remaining functional goals. (see flow sheet as applicable)     Details if applicable:     15 15 22639 Manual Therapy (timed):  decrease pain, increase ROM, increase tissue extensibility, and decrease trigger points to improve patient's ability to progress to PLOF and address remaining functional goals.  The manual therapy interventions were performed at a separate and distinct time from the therapeutic activities interventions . (see flow sheet as applicable)     Details if applicable:   Elbow and shoulder PROM all planes, D2 flex/ext, TrP release to middle deltoid           Details if applicable:     43  43  MC BC

## 2024-01-31 ENCOUNTER — HOSPITAL ENCOUNTER (OUTPATIENT)
Facility: HOSPITAL | Age: 74
Setting detail: RECURRING SERIES
Discharge: HOME | End: 2024-02-03
Payer: MEDICARE

## 2024-01-31 PROCEDURE — 97110 THERAPEUTIC EXERCISES: CPT

## 2024-01-31 PROCEDURE — 97530 THERAPEUTIC ACTIVITIES: CPT

## 2024-01-31 PROCEDURE — 97140 MANUAL THERAPY 1/> REGIONS: CPT

## 2024-01-31 NOTE — PROGRESS NOTES
PHYSICAL / OCCUPATIONAL THERAPY - DAILY TREATMENT NOTE (updated )    Patient Name: Rachell Monet    Date: 2024    : 1950  Insurance: Payor: MEDICARE / Plan: MEDICARE PART A AND B / Product Type: *No Product type* /      Patient  verified yes     Visit #   Current / Total 14 24   Time   In / Out 9:40 10:23   Total Treatment Time 43   Pain   In / Out 1/10 1/10   Subjective Functional Status/Changes: I feel good. I think I'm sleeping better.      NEXT PROGRESS NOTE DUE: 2024    TREATMENT AREA =  Left shoulder pain [M25.512]    OBJECTIVE    Modalities Rationale:     decrease edema, decrease inflammation, and decrease pain to improve patient's ability to progress to PLOF and address remaining functional goals.    PD min [] Estim Unattended, type/location:                                      []  w/ice    []  w/heat    min [] Estim Attended, type/location:                                     []  w/US     []  w/ice    []  w/heat    []  TENS insruct      min []  Mechanical Traction: type/lbs                   []  pro   []  sup   []  int   []  cont    []  before manual    []  after manual    min []  Ultrasound, settings/location:      min []  Iontophoresis w/ dexamethasone, location:                                               []  take home patch       []  in clinic    min  unbilled []  Ice     []  Heat    location/position:     min []  Paraffin,  details:     min []  Vasopneumatic Device, press/temp:     min []  Whirlpool / Fluido:    If using vaso (only need to measure limb vaso being performed on)      pre-treatment girth :       post-treatment girth :       measured at (landmark location) :      min []  Other:    Skin assessment post-treatment (if applicable):    []  intact    []  redness- no adverse reaction                 []redness - adverse reaction:      Vasopnuematic compression justification:  Per bilateral girth measures taken and listed above the edema is considered significant and

## 2024-02-02 ENCOUNTER — HOSPITAL ENCOUNTER (OUTPATIENT)
Facility: HOSPITAL | Age: 74
Setting detail: RECURRING SERIES
Discharge: HOME | End: 2024-02-05
Payer: MEDICARE

## 2024-02-02 PROCEDURE — G0283 ELEC STIM OTHER THAN WOUND: HCPCS | Performed by: GENERAL ACUTE CARE HOSPITAL

## 2024-02-02 PROCEDURE — 97530 THERAPEUTIC ACTIVITIES: CPT | Performed by: GENERAL ACUTE CARE HOSPITAL

## 2024-02-02 PROCEDURE — 97140 MANUAL THERAPY 1/> REGIONS: CPT | Performed by: GENERAL ACUTE CARE HOSPITAL

## 2024-02-02 PROCEDURE — 97110 THERAPEUTIC EXERCISES: CPT | Performed by: GENERAL ACUTE CARE HOSPITAL

## 2024-02-02 NOTE — PROGRESS NOTES
15PHYSICAL / OCCUPATIONAL THERAPY - DAILY TREATMENT NOTE (updated )    Patient Name: Rachell Monet    Date: 2024    : 1950  Insurance: Payor: MEDICARE / Plan: MEDICARE PART A AND B / Product Type: *No Product type* /      Patient  verified yes     Visit #   Current / Total 14 24   Time   In / Out 9:41 1035    Total Treatment Time 54    Pain   In / Out 1/10 1/10    Subjective Functional Status/Changes: Patient states she is having 4/10 right sided neck pain and difficulty turning her head.      NEXT PROGRESS NOTE DUE: 2024    TREATMENT AREA =  Left shoulder pain [M25.512]    OBJECTIVE    Modalities Rationale:     decrease edema, decrease inflammation, and decrease pain to improve patient's ability to progress to PLOF and address remaining functional goals.    10 min [x] Estim Unattended, type/location: RIGHT UT premod in seated                                      []  w/ice    [x]  w/heat    min [] Estim Attended, type/location:                                     []  w/US     []  w/ice    []  w/heat    []  TENS insruct      min []  Mechanical Traction: type/lbs                   []  pro   []  sup   []  int   []  cont    []  before manual    []  after manual    min []  Ultrasound, settings/location:      min []  Iontophoresis w/ dexamethasone, location:                                               []  take home patch       []  in clinic    min  unbilled []  Ice     []  Heat    location/position:     min []  Paraffin,  details:     min []  Vasopneumatic Device, press/temp:     min []  Whirlpool / Fluido:    If using vaso (only need to measure limb vaso being performed on)      pre-treatment girth :       post-treatment girth :       measured at (landmark location) :      min []  Other:    Skin assessment post-treatment (if applicable):    []  intact    []  redness- no adverse reaction                 []redness - adverse reaction:      Vasopnuematic compression justification:  Per bilateral

## 2024-02-07 ENCOUNTER — HOSPITAL ENCOUNTER (OUTPATIENT)
Facility: HOSPITAL | Age: 74
Setting detail: RECURRING SERIES
Discharge: HOME | End: 2024-02-10
Payer: MEDICARE

## 2024-02-07 PROCEDURE — 97530 THERAPEUTIC ACTIVITIES: CPT | Performed by: GENERAL ACUTE CARE HOSPITAL

## 2024-02-07 PROCEDURE — 97112 NEUROMUSCULAR REEDUCATION: CPT | Performed by: GENERAL ACUTE CARE HOSPITAL

## 2024-02-07 PROCEDURE — 97140 MANUAL THERAPY 1/> REGIONS: CPT | Performed by: GENERAL ACUTE CARE HOSPITAL

## 2024-02-07 PROCEDURE — 97110 THERAPEUTIC EXERCISES: CPT | Performed by: GENERAL ACUTE CARE HOSPITAL

## 2024-02-07 NOTE — PROGRESS NOTES
PHYSICAL / OCCUPATIONAL THERAPY - DAILY TREATMENT NOTE (updated )    Patient Name: Rachell Monet    Date: 2024    : 1950  Insurance: Payor: MEDICARE / Plan: MEDICARE PART A AND B / Product Type: *No Product type* /      Patient  verified yes     Visit #   Current / Total 16 24   Time   In / Out 940 1030    Total Treatment Time 50    Pain   In / Out 0/10 0/10    Subjective Functional Status/Changes: Patient reports her neck feels better.      NEXT PROGRESS NOTE DUE: 2024    TREATMENT AREA =  Left shoulder pain [M25.512]    OBJECTIVE    Modalities Rationale:     decrease edema, decrease inflammation, and decrease pain to improve patient's ability to progress to PLOF and address remaining functional goals.    PD min [x] Estim Unattended, type/location: RIGHT UT premod in seated                                      []  w/ice    [x]  w/heat    min [] Estim Attended, type/location:                                     []  w/US     []  w/ice    []  w/heat    []  TENS insruct      min []  Mechanical Traction: type/lbs                   []  pro   []  sup   []  int   []  cont    []  before manual    []  after manual    min []  Ultrasound, settings/location:      min []  Iontophoresis w/ dexamethasone, location:                                               []  take home patch       []  in clinic    min  unbilled []  Ice     []  Heat    location/position:     min []  Paraffin,  details:     min []  Vasopneumatic Device, press/temp:     min []  Whirlpool / Fluido:    If using vaso (only need to measure limb vaso being performed on)      pre-treatment girth :       post-treatment girth :       measured at (landmark location) :      min []  Other:    Skin assessment post-treatment (if applicable):    []  intact    []  redness- no adverse reaction                 []redness - adverse reaction:      Vasopnuematic compression justification:  Per bilateral girth measures taken and listed above the edema is

## 2024-02-09 ENCOUNTER — HOSPITAL ENCOUNTER (OUTPATIENT)
Facility: HOSPITAL | Age: 74
Setting detail: RECURRING SERIES
Discharge: HOME | End: 2024-02-12
Payer: MEDICARE

## 2024-02-09 PROCEDURE — 97530 THERAPEUTIC ACTIVITIES: CPT | Performed by: GENERAL ACUTE CARE HOSPITAL

## 2024-02-09 PROCEDURE — 97110 THERAPEUTIC EXERCISES: CPT | Performed by: GENERAL ACUTE CARE HOSPITAL

## 2024-02-09 NOTE — PROGRESS NOTES
[facility]  [address] [place] [address]  Phone: [phone]   [phone]  PHYSICAL THERAPY PROGRESS NOTE  Patient Name: Rachell Monet : 1950   Treatment/Medical Diagnosis: Left shoulder pain [M25.512]   Referral Source: Augusto Candelaria MD     Date of Initial Visit: 2023 Attended Visits: 17 Missed Visits: 0     SUMMARY OF TREATMENT  The pt has been seen for 17 visits to address left shoulder pain s/p RTC repair on 2023. Therapeutic interventions have included therapeutic exercise, therapeutic activity, neuromuscular re-education, manual treatment, modalities and patient education.     CURRENT STATUS  Subjective:   % improvement: 70-80%  Max pain 0/10  Avg pain 0/10  Min pain 0/10     Current improvements: strength, improved sleep, able to sleep on left side, nagging and aching pain gone  Remaining functional limitations: feels resistance when reaching up overhead or behind her back, difficulty with reaching for seat belt, some difficulty with lifting overhead      FOTO: 77/100 +7 points     Objective Information/Functional Measures/Assessment:  The patient presents for reassessment following 17 visits to address left shoulder pain s/p RTC repair on 2023   Improvement in functional mobility indicated by FOTO score change of +7 points to 77/100   She has maintained good compliance with HEP  Left shoulder strength improved to flexion 5/5, abduction 4+/5, ER 4+/5, IR 5/5   Left shoulder AROM improved to flexion 140 deg and abduction 120 deg   Left functional ER and IR now equal to the right, however noted increased challenge with movement. Patient states she plans to buy set of over-the-door pulleys for home to continue gains in flexion, ABD and functional IR ROM.   Patient able to perform OH activity for 2 minutes however notes increased fatigue of left shoulder 6/10 intensity.  The patient will benefit from 
analyze and address functional mobility deficits, analyze and address ROM deficits, analyze and address strength deficits, analyze and address soft tissue restrictions, analyze and cue for proper movement patterns, analyze and modify for postural abnormalities, analyze and address imbalance/dizziness, and instruct in home and community integration to address functional deficits and attain remaining goals.    Progress toward goals / Updated goals:  []  See Progress Note/Recertification    LONG-TERM GOALS TO BE ACHIEVED IN 15 treatments   The patient will present with left shoulder strength globally of 5/5 for improved ADL and household chore tolerance.   Status at eval: NT   Current:  flexion 4-/5, extension 5/5, ER 4-/5, IR 4+/5 01/10/2024, flexion 5/5, abduction 4+/5, ER 4+/5, IR 5/5  02/09/2024  The patient will improve left shoulder ABD AROM to 140 deg to improve her OH mobility..   Status at eval: NT per MD instruction on prescription   Current:  NT per MD instruction on prescription, assess after virtual follow up 01/10/2024; initiated shoulder ABD strengthening per MD instruction 01/26/2024, 135 deg 02/02/2024; flexion 140 deg, abduction 120 deg 02/0/9/2024  The patient will improve left functional ER AROM to C7 and IR AROM to T7 for improved ADL tolerance.   Status at eval: functional ER C7, functional IR T12  Current:  progressing, IR to roughly T10, but just briefly and with some momentum; ER behind head to base of neck, able to reach around to outside of Right ear 01/17/2024, Left functional ER and IR now equal to the right, however noted increased challenge with movement 02/09/2024  The patient will improve FOTO score to 70/100 as a functional indicator of improved mobility.    Status at eval: 61/100  Current:  progressing, 70/100 pts 01/10/2024, MET 77/100 02/09/2024    PLAN  [x]  Continue plan of care  []  Upgrade activities as tolerated  []  Discharge due to :  [x]  Other: Pt to continue current

## 2024-02-13 ENCOUNTER — APPOINTMENT (OUTPATIENT)
Facility: HOSPITAL | Age: 74
End: 2024-02-13
Payer: MEDICARE

## 2024-02-13 ENCOUNTER — COMPREHENSIVE EXAM (OUTPATIENT)
Dept: URBAN - METROPOLITAN AREA CLINIC 1 | Facility: CLINIC | Age: 74
End: 2024-02-13

## 2024-02-13 DIAGNOSIS — H04.123: ICD-10-CM

## 2024-02-13 DIAGNOSIS — H43.813: ICD-10-CM

## 2024-02-13 DIAGNOSIS — Z96.1: ICD-10-CM

## 2024-02-13 PROCEDURE — 99214 OFFICE O/P EST MOD 30 MIN: CPT

## 2024-02-13 ASSESSMENT — TONOMETRY
OD_IOP_MMHG: 13
OS_IOP_MMHG: 13

## 2024-02-13 ASSESSMENT — VISUAL ACUITY
OD_SC: 20/20-1
OS_SC: J1+
OS_SC: 20/20
OD_SC: J1+

## 2024-02-15 ENCOUNTER — HOSPITAL ENCOUNTER (OUTPATIENT)
Facility: HOSPITAL | Age: 74
Setting detail: RECURRING SERIES
Discharge: HOME | End: 2024-02-18
Payer: MEDICARE

## 2024-02-15 PROCEDURE — 97530 THERAPEUTIC ACTIVITIES: CPT

## 2024-02-15 PROCEDURE — 97110 THERAPEUTIC EXERCISES: CPT

## 2024-02-15 PROCEDURE — 97140 MANUAL THERAPY 1/> REGIONS: CPT

## 2024-02-15 NOTE — PROGRESS NOTES
PHYSICAL / OCCUPATIONAL THERAPY - DAILY TREATMENT NOTE (updated )    Patient Name: Rachell Monet    Date: 2/15/2024    : 1950  Insurance: Payor: MEDICARE / Plan: MEDICARE PART A AND B / Product Type: *No Product type* /      Patient  verified yes     Visit #   Current / Total 18 24   Time   In / Out 11:02 11:48   Total Treatment Time 46   Pain   In / Out 0/10 010   Subjective Functional Status/Changes:  I still have a hard time reaching up with my left hand to fix my hair, I can feel it pulling to the back and front of my shoulder when I try          NEXT PROGRESS NOTE DUE: 03/10/2024 **RECERTIFICATION**    TREATMENT AREA =  Left shoulder pain [M25.512]    OBJECTIVE    Modalities Rationale:     decrease edema, decrease inflammation, and decrease pain to improve patient's ability to progress to PLOF and address remaining functional goals.    PD min [x] Estim Unattended, type/location: RIGHT UT premod in seated                                      []  w/ice    [x]  w/heat    min [] Estim Attended, type/location:                                     []  w/US     []  w/ice    []  w/heat    []  TENS insruct      min []  Mechanical Traction: type/lbs                   []  pro   []  sup   []  int   []  cont    []  before manual    []  after manual    min []  Ultrasound, settings/location:      min []  Iontophoresis w/ dexamethasone, location:                                               []  take home patch       []  in clinic    min  unbilled []  Ice     []  Heat    location/position:     min []  Paraffin,  details:     min []  Vasopneumatic Device, press/temp:     min []  Whirlpool / Fluido:    If using vaso (only need to measure limb vaso being performed on)      pre-treatment girth :       post-treatment girth :       measured at (landmark location) :      min []  Other:    Skin assessment post-treatment (if applicable):    []  intact    []  redness- no adverse reaction                 []redness -

## 2024-02-21 ENCOUNTER — HOSPITAL ENCOUNTER (OUTPATIENT)
Facility: HOSPITAL | Age: 74
Setting detail: RECURRING SERIES
Discharge: HOME | End: 2024-02-24
Payer: MEDICARE

## 2024-02-21 PROCEDURE — 97530 THERAPEUTIC ACTIVITIES: CPT

## 2024-02-21 PROCEDURE — 97140 MANUAL THERAPY 1/> REGIONS: CPT

## 2024-02-21 NOTE — PROGRESS NOTES
PHYSICAL / OCCUPATIONAL THERAPY - DAILY TREATMENT NOTE (updated )    Patient Name: Rachell Monet    Date: 2024    : 1950  Insurance: Payor: MEDICARE / Plan: MEDICARE PART A AND B / Product Type: *No Product type* /      Patient  verified yes     Visit #   Current / Total 19 24   Time   In / Out 10:22 11:07    Total Treatment Time 45   Pain   In / Out 1/10 0/10   Subjective Functional Status/Changes: This weekend I was away and I was carrying a lot of stuff and I'm just a little sore.      NEXT PROGRESS NOTE DUE: 03/10/2024    TREATMENT AREA =  Left shoulder pain [M25.512]    OBJECTIVE    Modalities Rationale:     decrease pain and increase tissue extensibility to improve patient's ability to progress to PLOF and address remaining functional goals.     min [] Estim Unattended, type/location:                                      []  w/ice    []  w/heat    min [] Estim Attended, type/location:                                     []  w/US     []  w/ice    []  w/heat    []  TENS insruct      min []  Mechanical Traction: type/lbs                   []  pro   []  sup   []  int   []  cont    []  before manual    []  after manual    min []  Ultrasound, settings/location:      min []  Iontophoresis w/ dexamethasone, location:                                               []  take home patch       []  in clinic   PD min  unbilled []  Ice     []  Heat    location/position:     min []  Paraffin,  details:     min []  Vasopneumatic Device, press/temp:     min []  Whirlpool / Fluido:    If using vaso (only need to measure limb vaso being performed on)      pre-treatment girth :       post-treatment girth :       measured at (landmark location) :      min []  Other:    Skin assessment post-treatment (if applicable):    []  intact    []  redness- no adverse reaction                 []redness - adverse reaction:      Vasopnuematic compression justification:  Per bilateral girth measures taken and listed above the

## 2024-02-28 ENCOUNTER — HOSPITAL ENCOUNTER (OUTPATIENT)
Facility: HOSPITAL | Age: 74
Setting detail: RECURRING SERIES
Discharge: HOME | End: 2024-03-02
Payer: MEDICARE

## 2024-02-28 PROCEDURE — 97140 MANUAL THERAPY 1/> REGIONS: CPT

## 2024-02-28 NOTE — PROGRESS NOTES
PHYSICAL / OCCUPATIONAL THERAPY - DAILY TREATMENT NOTE (updated )    Patient Name: Rachell Monet    Date: 2024    : 1950  Insurance: Payor: MEDICARE / Plan: MEDICARE PART A AND B / Product Type: *No Product type* /      Patient  verified yes     Visit #   Current / Total 20 24   Time   In / Out 10:23 11:02   Total Treatment Time 39   Pain   In / Out 1-2/10 0.5/10   Subjective Functional Status/Changes: It's sore in my shoulder today, I scrubbed the back of the house yesterday. It felt good after last time.      NEXT PROGRESS NOTE DUE: 03/10/2024    TREATMENT AREA =  Left shoulder pain [M25.512]    OBJECTIVE    Modalities Rationale:     decrease pain and increase tissue extensibility to improve patient's ability to progress to PLOF and address remaining functional goals.     min [] Estim Unattended, type/location:                                      []  w/ice    []  w/heat    min [] Estim Attended, type/location:                                     []  w/US     []  w/ice    []  w/heat    []  TENS insruct      min []  Mechanical Traction: type/lbs                   []  pro   []  sup   []  int   []  cont    []  before manual    []  after manual    min []  Ultrasound, settings/location:      min []  Iontophoresis w/ dexamethasone, location:                                               []  take home patch       []  in clinic   PD min  unbilled []  Ice     []  Heat    location/position:     min []  Paraffin,  details:     min []  Vasopneumatic Device, press/temp:     min []  Whirlpool / Fluido:    If using vaso (only need to measure limb vaso being performed on)      pre-treatment girth :       post-treatment girth :       measured at (landmark location) :      min []  Other:    Skin assessment post-treatment (if applicable):    []  intact    []  redness- no adverse reaction                 []redness - adverse reaction:      Vasopnuematic compression justification:  Per bilateral girth measures

## 2024-03-06 ENCOUNTER — HOSPITAL ENCOUNTER (OUTPATIENT)
Facility: HOSPITAL | Age: 74
Setting detail: RECURRING SERIES
Discharge: HOME | End: 2024-03-09
Payer: MEDICARE

## 2024-03-06 PROCEDURE — 97110 THERAPEUTIC EXERCISES: CPT | Performed by: GENERAL ACUTE CARE HOSPITAL

## 2024-03-06 PROCEDURE — 97530 THERAPEUTIC ACTIVITIES: CPT | Performed by: GENERAL ACUTE CARE HOSPITAL

## 2024-03-06 NOTE — PROGRESS NOTES
[facility]  [address], [place] [address], Phone [phone], fax [phone]  CONTINUED PLAN OF CARE/RECERTIFICATION FOR PHYSICAL THERAPY          Patient Name: Rachell Monet : 1950   Treatment/Medical Diagnosis: Left shoulder pain [M25.512]   Onset Date: 2023    Referral Source: Augusto Candelaria MD Start of Care (SOC): 2023   Prior Hospitalization: See Medical History Provider #: [id number]   Prior Level of Function: IND, retired   Comorbidities: Arthritis, back pain, prior surgery   Visits from SOC: 21 Missed Visits: 0     Progress to Goals:  LONG-TERM GOALS TO BE ACHIEVED IN 7 treatments :  The patient will be IND and compliant with HEP to prepare for DC.   Status at eval: DEVELOPED   Status at PN (01/10/2024):  reports ongoing compliance  Status at PN (2024): reports ongoing compliance  Current status: progressing, reports compliance, purchased home pulley set 2024, compliance with pulley's but admits to poor compliance with strengthening 2024  The patient will improve left shoulder ABD and ER strength to 5/5 for improved ADL tolerance.   Status at eval: NT  Status at PN (01/10/2024):  NT  Status at PN (2024): flexion 5/5, abduction 4+/5, ER 4+/5, IR 5/5  Current status: progressing, IR 5/5, ER remains 4+/5 2024, 5/5 globally 2024  The patient will perform OH activity for 2 minutes with c/o muscle fatigue <3/10 intensity for improved OH mobility.   Status at eval: NT  Status at PN (01/10/2024):  NT  Status at PN (2024): able to perform 2 minutes OH with fatigue 6/10 intensity  Current: 2 minutes with fatigue 6/10 intensity 2024     Key Functional Changes/Progress:   Subjective:  % improvement: 95%  Max pain 1-2/10 ache, 0/10 pain   Avg pain 0/10  Min pain 0/10     Current improvements: improved ability with reaching behind her back, improved ability to reach seated belt, 
post-treatment girth :       measured at (landmark location) :      min []  Other:    Skin assessment post-treatment (if applicable):    []  intact    []  redness- no adverse reaction                 []redness - adverse reaction:      Vasopnuematic compression justification:  Per bilateral girth measures taken and listed above the edema is considered significant and having an impact on the patient's strength, self care, and ADL's     Therapeutic Procedures:  Tx Min Billable or 1:1 Min (if diff from Tx Min) Procedure, Rationale, Specifics   12 12 [id number] Therapeutic Exercise (timed):  increase ROM, strength, coordination, balance, and proprioception to improve patient's ability to progress to PLOF and address remaining functional goals. (see flow sheet as applicable)     Details if applicable:    REASSESSMENT, HEP REVIEW     TC:   Bent row kick back, M  bicep curls     12 11 [id number] Therapeutic Activity (timed):  use of dynamic activities replicating functional movements to increase ROM, strength, coordination, balance, and proprioception in order to improve patient's ability to progress to PLOF and address remaining functional goals.  (see flow sheet as applicable)     Details if applicable:    UBE  FOTO ASSESSMENT AND REVIEW       TC:   full cans flexion, scaption, ABD  STD scap stabs   Wall push ups- TC  TB rows, ext      [id number] Neuromuscular Re-Education (timed):  improve balance, coordination, kinesthetic sense, posture, core stability and proprioception to improve patient's ability to develop conscious control of individual muscles and awareness of position of extremities in order to progress to PLOF and address remaining functional goals. (see flow sheet as applicable)     Details if applicable:     held held [id number] Manual Therapy (timed):  decrease pain, increase ROM, increase tissue extensibility, and decrease trigger points to improve patient's ability to progress to PLOF and address remaining functional goals.

## 2024-08-15 ENCOUNTER — FOLLOW UP (OUTPATIENT)
Dept: URBAN - METROPOLITAN AREA CLINIC 1 | Facility: CLINIC | Age: 74
End: 2024-08-15

## 2024-08-15 DIAGNOSIS — H04.123: ICD-10-CM

## 2024-08-15 DIAGNOSIS — H16.143: ICD-10-CM

## 2024-08-15 PROCEDURE — 99213 OFFICE O/P EST LOW 20 MIN: CPT

## 2024-08-15 ASSESSMENT — VISUAL ACUITY
OS_SC: J1
OS_SC: 20/25
OD_SC: 20/25-1
OD_SC: J1

## 2024-08-15 ASSESSMENT — TONOMETRY
OS_IOP_MMHG: 14
OD_IOP_MMHG: 13

## 2024-10-31 ENCOUNTER — EMERGENCY VISIT (OUTPATIENT)
Dept: URBAN - METROPOLITAN AREA CLINIC 1 | Facility: CLINIC | Age: 74
End: 2024-10-31

## 2024-10-31 DIAGNOSIS — H11.31: ICD-10-CM

## 2024-10-31 PROCEDURE — 99213 OFFICE O/P EST LOW 20 MIN: CPT

## 2025-02-28 ENCOUNTER — COMPREHENSIVE EXAM (OUTPATIENT)
Age: 75
End: 2025-02-28

## 2025-02-28 DIAGNOSIS — H16.143: ICD-10-CM

## 2025-02-28 DIAGNOSIS — H04.123: ICD-10-CM

## 2025-02-28 DIAGNOSIS — G43.B0: ICD-10-CM

## 2025-02-28 DIAGNOSIS — Z96.1: ICD-10-CM

## 2025-02-28 DIAGNOSIS — H43.813: ICD-10-CM

## 2025-02-28 PROCEDURE — 99214 OFFICE O/P EST MOD 30 MIN: CPT
